# Patient Record
Sex: FEMALE | Race: BLACK OR AFRICAN AMERICAN | URBAN - METROPOLITAN AREA
[De-identification: names, ages, dates, MRNs, and addresses within clinical notes are randomized per-mention and may not be internally consistent; named-entity substitution may affect disease eponyms.]

---

## 2024-04-27 ENCOUNTER — APPOINTMENT (OUTPATIENT)
Dept: GENERAL RADIOLOGY | Age: 39
End: 2024-04-27
Payer: OTHER MISCELLANEOUS

## 2024-04-27 ENCOUNTER — HOSPITAL ENCOUNTER (EMERGENCY)
Age: 39
Discharge: HOME | End: 2024-04-28
Payer: OTHER MISCELLANEOUS

## 2024-04-27 ENCOUNTER — APPOINTMENT (OUTPATIENT)
Dept: CT IMAGING | Age: 39
End: 2024-04-27
Payer: OTHER MISCELLANEOUS

## 2024-04-27 DIAGNOSIS — M25.562 ACUTE PAIN OF LEFT KNEE: ICD-10-CM
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
## 2024-04-27 DIAGNOSIS — S01.511A LIP LACERATION, INITIAL ENCOUNTER: ICD-10-CM

## 2024-04-27 DIAGNOSIS — S00.83XA CONTUSION OF FACE, INITIAL ENCOUNTER: ICD-10-CM

## 2024-04-27 DIAGNOSIS — S50.312A ABRASION OF LEFT ELBOW, INITIAL ENCOUNTER: ICD-10-CM

## 2024-04-27 PROCEDURE — 73564 X-RAY EXAM KNEE 4 OR MORE: CPT

## 2024-04-27 PROCEDURE — 99284 EMERGENCY DEPT VISIT MOD MDM: CPT

## 2024-04-27 PROCEDURE — 70486 CT MAXILLOFACIAL W/O DYE: CPT

## 2024-04-27 PROCEDURE — 72131 CT LUMBAR SPINE W/O DYE: CPT

## 2024-04-27 PROCEDURE — 72128 CT CHEST SPINE W/O DYE: CPT

## 2024-04-27 PROCEDURE — 70450 CT HEAD/BRAIN W/O DYE: CPT

## 2024-04-27 PROCEDURE — 72125 CT NECK SPINE W/O DYE: CPT

## 2024-04-27 PROCEDURE — 73590 X-RAY EXAM OF LOWER LEG: CPT

## 2024-04-27 PROCEDURE — 6360000002 HC RX W HCPCS: Performed by: PHYSICIAN ASSISTANT

## 2024-04-27 PROCEDURE — 96372 THER/PROPH/DIAG INJ SC/IM: CPT

## 2024-04-27 PROCEDURE — 12011 RPR F/E/E/N/L/M 2.5 CM/<: CPT

## 2024-04-27 PROCEDURE — 73070 X-RAY EXAM OF ELBOW: CPT

## 2024-04-27 RX ORDER — MORPHINE SULFATE 4 MG/ML
4 INJECTION, SOLUTION INTRAMUSCULAR; INTRAVENOUS ONCE
Status: COMPLETED | OUTPATIENT
Start: 2024-04-27 | End: 2024-04-27

## 2024-04-27 RX ADMIN — MORPHINE SULFATE 4 MG: 4 INJECTION, SOLUTION INTRAMUSCULAR; INTRAVENOUS at 23:27

## 2024-04-27 ASSESSMENT — PAIN SCALES - GENERAL: PAINLEVEL_OUTOF10: 10

## 2024-04-28 VITALS
TEMPERATURE: 98 F | RESPIRATION RATE: 17 BRPM | HEART RATE: 98 BPM | OXYGEN SATURATION: 98 % | DIASTOLIC BLOOD PRESSURE: 70 MMHG | SYSTOLIC BLOOD PRESSURE: 109 MMHG

## 2024-04-28 PROCEDURE — 6370000000 HC RX 637 (ALT 250 FOR IP): Performed by: PHYSICIAN ASSISTANT

## 2024-04-28 RX ORDER — CYCLOBENZAPRINE HCL 10 MG
10 TABLET ORAL 3 TIMES DAILY PRN
Qty: 15 TABLET | Refills: 0 | Status: SHIPPED | OUTPATIENT
Start: 2024-04-28

## 2024-04-28 RX ORDER — IBUPROFEN 800 MG/1
800 TABLET ORAL EVERY 6 HOURS PRN
Qty: 30 TABLET | Refills: 0 | Status: SHIPPED | OUTPATIENT
Start: 2024-04-28

## 2024-04-28 RX ADMIN — Medication: at 01:19

## 2024-04-28 NOTE — ED PROVIDER NOTES
dictation.    EKG:  When ordered, EKG's are interpreted by the Emergency Department Physician in the absence of a cardiologist.  Please see their note for interpretation of EKG.    RADIOLOGY:   Non-plain film images such as CT, Ultrasound and MRI are read by the radiologist.  Plain radiographic images are visualized and preliminarily interpreted by the ED Provider.    Interpretation per the Radiologist below:    XR KNEE LEFT (MIN 4 VIEWS)   Final Result      No acute osseous abnormality in the left knee, left leg, or left elbow.      Electronically signed by Xander Andino MD      XR TIBIA FIBULA LEFT (2 VIEWS)   Final Result      No acute osseous abnormality in the left knee, left leg, or left elbow.      Electronically signed by Xander Andino MD      XR ELBOW LEFT (2 VIEWS)   Final Result      No acute osseous abnormality in the left knee, left leg, or left elbow.      Electronically signed by Xander Andino MD      CT HEAD WO CONTRAST   Final Result      Head: No acute intracranial hemorrhage or mass effect.      Face: Small right periorbital/facial hematoma without fracture.      Cervical spine: No acute osseous abnormality.      Thoracic spine: No acute osseous abnormality.      Lumbar spine: No acute osseous abnormality.      Electronically signed by Xander Andino MD      CT FACIAL BONES WO CONTRAST   Final Result      Head: No acute intracranial hemorrhage or mass effect.      Face: Small right periorbital/facial hematoma without fracture.      Cervical spine: No acute osseous abnormality.      Thoracic spine: No acute osseous abnormality.      Lumbar spine: No acute osseous abnormality.      Electronically signed by Xander Andino MD      CT CERVICAL SPINE WO CONTRAST   Final Result      Head: No acute intracranial hemorrhage or mass effect.      Face: Small right periorbital/facial hematoma without fracture.      Cervical spine: No acute osseous abnormality.      Thoracic spine: No acute osseous